# Patient Record
Sex: FEMALE | Race: WHITE | NOT HISPANIC OR LATINO
[De-identification: names, ages, dates, MRNs, and addresses within clinical notes are randomized per-mention and may not be internally consistent; named-entity substitution may affect disease eponyms.]

---

## 2017-01-03 ENCOUNTER — TRANSCRIPTION ENCOUNTER (OUTPATIENT)
Age: 52
End: 2017-01-03

## 2017-10-04 ENCOUNTER — APPOINTMENT (OUTPATIENT)
Dept: GASTROENTEROLOGY | Facility: CLINIC | Age: 52
End: 2017-10-04

## 2017-10-25 ENCOUNTER — TRANSCRIPTION ENCOUNTER (OUTPATIENT)
Age: 52
End: 2017-10-25

## 2017-12-02 ENCOUNTER — TRANSCRIPTION ENCOUNTER (OUTPATIENT)
Age: 52
End: 2017-12-02

## 2019-11-03 ENCOUNTER — TRANSCRIPTION ENCOUNTER (OUTPATIENT)
Age: 54
End: 2019-11-03

## 2020-02-23 ENCOUNTER — TRANSCRIPTION ENCOUNTER (OUTPATIENT)
Age: 55
End: 2020-02-23

## 2020-08-17 ENCOUNTER — TRANSCRIPTION ENCOUNTER (OUTPATIENT)
Age: 55
End: 2020-08-17

## 2020-10-31 ENCOUNTER — TRANSCRIPTION ENCOUNTER (OUTPATIENT)
Age: 55
End: 2020-10-31

## 2020-11-16 ENCOUNTER — TRANSCRIPTION ENCOUNTER (OUTPATIENT)
Age: 55
End: 2020-11-16

## 2020-12-19 ENCOUNTER — TRANSCRIPTION ENCOUNTER (OUTPATIENT)
Age: 55
End: 2020-12-19

## 2021-01-02 ENCOUNTER — TRANSCRIPTION ENCOUNTER (OUTPATIENT)
Age: 56
End: 2021-01-02

## 2021-06-28 ENCOUNTER — TRANSCRIPTION ENCOUNTER (OUTPATIENT)
Age: 56
End: 2021-06-28

## 2021-07-11 ENCOUNTER — TRANSCRIPTION ENCOUNTER (OUTPATIENT)
Age: 56
End: 2021-07-11

## 2021-11-08 ENCOUNTER — NON-APPOINTMENT (OUTPATIENT)
Age: 56
End: 2021-11-08

## 2021-11-11 ENCOUNTER — APPOINTMENT (OUTPATIENT)
Dept: INTERNAL MEDICINE | Facility: CLINIC | Age: 56
End: 2021-11-11

## 2022-01-17 ENCOUNTER — TRANSCRIPTION ENCOUNTER (OUTPATIENT)
Age: 57
End: 2022-01-17

## 2022-04-20 ENCOUNTER — TRANSCRIPTION ENCOUNTER (OUTPATIENT)
Age: 57
End: 2022-04-20

## 2022-04-20 ENCOUNTER — NON-APPOINTMENT (OUTPATIENT)
Age: 57
End: 2022-04-20

## 2022-05-13 ENCOUNTER — APPOINTMENT (OUTPATIENT)
Dept: INTERNAL MEDICINE | Facility: CLINIC | Age: 57
End: 2022-05-13

## 2022-05-25 ENCOUNTER — NON-APPOINTMENT (OUTPATIENT)
Age: 57
End: 2022-05-25

## 2022-11-16 ENCOUNTER — OFFICE (OUTPATIENT)
Dept: URBAN - METROPOLITAN AREA CLINIC 116 | Facility: CLINIC | Age: 57
Setting detail: OPHTHALMOLOGY
End: 2022-11-16
Payer: COMMERCIAL

## 2022-11-16 DIAGNOSIS — H25.13: ICD-10-CM

## 2022-11-16 DIAGNOSIS — H52.4: ICD-10-CM

## 2022-11-16 PROCEDURE — CLNEW CL LENS FIT FIRST TIME WEARER FITTING FEE ONLY: Performed by: OPTOMETRIST

## 2022-11-16 PROCEDURE — 92014 COMPRE OPH EXAM EST PT 1/>: CPT | Performed by: OPTOMETRIST

## 2022-11-16 ASSESSMENT — TONOMETRY
OS_IOP_MMHG: 12
OD_IOP_MMHG: 12

## 2022-11-16 ASSESSMENT — CONFRONTATIONAL VISUAL FIELD TEST (CVF)
OD_FINDINGS: FULL
OS_FINDINGS: FULL

## 2022-12-09 ENCOUNTER — OFFICE (OUTPATIENT)
Dept: URBAN - METROPOLITAN AREA CLINIC 116 | Facility: CLINIC | Age: 57
Setting detail: OPHTHALMOLOGY
End: 2022-12-09
Payer: COMMERCIAL

## 2022-12-09 DIAGNOSIS — H52.4: ICD-10-CM

## 2022-12-09 PROCEDURE — N/C NO CHARGE: Performed by: OPTOMETRIST

## 2022-12-09 ASSESSMENT — REFRACTION_CURRENTRX
OS_VPRISM_DIRECTION: PROGS
OD_SPHERE: +0.75
OD_AXIS: 175
OS_SPHERE: +0.50
OD_ADD: +1.75
OD_OVR_VA: 20/
OS_OVR_VA: 20/
OD_CYLINDER: SPH
OS_CYLINDER: SPH
OS_CYLINDER: SPH
OD_ADD: +1.75
OD_VPRISM_DIRECTION: PROGS
OD_SPHERE: +3.00
OS_ADD: +1.75
OD_VPRISM_DIRECTION: SV
OS_OVR_VA: 20/
OD_SPHERE: +0.50
OS_OVR_VA: 20/
OS_VPRISM_DIRECTION: SV
OS_SPHERE: +3.00
OD_CYLINDER: -0.25
OD_CYLINDER: SPH
OS_ADD: +2.00
OS_CYLINDER: SPH
OD_OVR_VA: 20/
OD_VPRISM_DIRECTION: PROGS
OD_OVR_VA: 20/
OS_VPRISM_DIRECTION: PROGS
OS_SPHERE: +0.50

## 2022-12-09 ASSESSMENT — SPHEQUIV_DERIVED
OS_SPHEQUIV: 0.875
OD_SPHEQUIV: 0.875

## 2022-12-09 ASSESSMENT — AXIALLENGTH_DERIVED
OD_AL: 23.2559
OS_AL: 23.2559

## 2022-12-09 ASSESSMENT — REFRACTION_MANIFEST
OD_CYLINDER: SPH
OD_VA2: 20/20
OS_CYLINDER: SPH
OD_ADD: +1.75
OD_SPHERE: +0.75
OS_VA2: 20/20
OD_ADD: +2.50
OS_ADD: +1.75
OS_SPHERE: +0.75
OS_SPHERE: +0.50
OD_CYLINDER: SPH
OS_VA1: 20/20
OS_ADD: +2.50
OS_VA1: 20/20
OD_SPHERE: +0.50
OD_VA1: 20/20
OD_VA1: 20/20
OS_CYLINDER: SPH

## 2022-12-09 ASSESSMENT — REFRACTION_AUTOREFRACTION
OS_SPHERE: +1.00
OD_SPHERE: +1.00
OS_CYLINDER: -0.25
OS_AXIS: 140
OD_CYLINDER: -0.25
OD_AXIS: 070

## 2022-12-09 ASSESSMENT — KERATOMETRY
OD_K2POWER_DIOPTERS: 43.50
OD_K1POWER_DIOPTERS: 43.50
OD_AXISANGLE_DEGREES: 090
OS_AXISANGLE_DEGREES: 050
OS_K1POWER_DIOPTERS: 43.25
OS_K2POWER_DIOPTERS: 43.75

## 2022-12-09 ASSESSMENT — VISUAL ACUITY
OD_BCVA: 20/30
OS_BCVA: 20/30

## 2022-12-12 ASSESSMENT — REFRACTION_CURRENTRX
OS_OVR_VA: 20/
OS_ADD: +1.75
OD_OVR_VA: 20/
OS_OVR_VA: 20/
OD_SPHERE: +0.50
OD_VPRISM_DIRECTION: PROGS
OD_ADD: +1.75
OS_SPHERE: +0.50
OD_SPHERE: +3.00
OD_VPRISM_DIRECTION: SV
OS_OVR_VA: 20/
OS_VPRISM_DIRECTION: PROGS
OD_SPHERE: +0.75
OD_CYLINDER: -0.25
OD_AXIS: 175
OS_SPHERE: +3.00
OD_OVR_VA: 20/
OS_CYLINDER: SPH
OD_VPRISM_DIRECTION: PROGS
OS_CYLINDER: SPH
OD_OVR_VA: 20/
OS_SPHERE: +0.50
OS_CYLINDER: SPH
OD_CYLINDER: SPH
OS_VPRISM_DIRECTION: PROGS
OD_CYLINDER: SPH
OS_ADD: +2.00
OS_VPRISM_DIRECTION: SV
OD_ADD: +1.75

## 2022-12-12 ASSESSMENT — REFRACTION_MANIFEST
OD_ADD: +2.50
OD_ADD: +1.75
OD_VA1: 20/20
OS_VA1: 20/20
OS_VA1: 20/20
OS_SPHERE: +0.75
OD_VA2: 20/20
OS_CYLINDER: SPH
OD_SPHERE: +0.50
OS_ADD: +1.75
OS_VA2: 20/20
OS_CYLINDER: SPH
OS_SPHERE: +0.50
OD_CYLINDER: SPH
OD_VA1: 20/20
OS_ADD: +2.50
OD_SPHERE: +0.75
OD_CYLINDER: SPH

## 2022-12-12 ASSESSMENT — KERATOMETRY
OS_K1POWER_DIOPTERS: 43.25
OD_K2POWER_DIOPTERS: 43.50
OS_AXISANGLE_DEGREES: 050
OD_AXISANGLE_DEGREES: 090
OD_K1POWER_DIOPTERS: 43.50
OS_K2POWER_DIOPTERS: 43.75

## 2022-12-12 ASSESSMENT — AXIALLENGTH_DERIVED
OS_AL: 23.2559
OD_AL: 23.2559

## 2022-12-12 ASSESSMENT — SPHEQUIV_DERIVED
OD_SPHEQUIV: 0.875
OS_SPHEQUIV: 0.875

## 2022-12-12 ASSESSMENT — REFRACTION_AUTOREFRACTION
OD_SPHERE: +1.00
OD_CYLINDER: -0.25
OS_AXIS: 140
OS_SPHERE: +1.00
OS_CYLINDER: -0.25
OD_AXIS: 070

## 2022-12-12 ASSESSMENT — VISUAL ACUITY
OD_BCVA: 20/30
OS_BCVA: 20/30

## 2022-12-19 ENCOUNTER — OFFICE (OUTPATIENT)
Dept: URBAN - METROPOLITAN AREA CLINIC 116 | Facility: CLINIC | Age: 57
Setting detail: OPHTHALMOLOGY
End: 2022-12-19
Payer: COMMERCIAL

## 2022-12-19 DIAGNOSIS — H52.4: ICD-10-CM

## 2022-12-19 PROCEDURE — N/C NO CHARGE: Performed by: OPTOMETRIST

## 2022-12-19 ASSESSMENT — KERATOMETRY
OD_AXISANGLE_DEGREES: 090
OS_AXISANGLE_DEGREES: 050
OS_K1POWER_DIOPTERS: 43.25
OD_K1POWER_DIOPTERS: 43.50
OD_K2POWER_DIOPTERS: 43.50
OS_K2POWER_DIOPTERS: 43.75

## 2022-12-19 ASSESSMENT — VISUAL ACUITY
OS_BCVA: 20/30
OD_BCVA: 20/30

## 2022-12-19 ASSESSMENT — CONFRONTATIONAL VISUAL FIELD TEST (CVF)
OS_FINDINGS: FULL
OD_FINDINGS: FULL

## 2023-05-10 ASSESSMENT — REFRACTION_MANIFEST
OD_VA1: 20/20
OD_VA1: 20/20
OS_ADD: +2.50
OD_VA2: 20/20
OS_SPHERE: +0.75
OD_ADD: +1.75
OD_SPHERE: +0.75
OS_SPHERE: -1.50
OS_CYLINDER: SPH
OS_SPHERE: +0.50
OS_SPHERE: +0.75
OS_CYLINDER: SPH
OS_VA1: 20/20
OS_CYLINDER: SPH
OD_CYLINDER: SPH
OS_ADD: +1.75
OS_ADD: +1.75
OD_CYLINDER: SPH
OS_VA1: 20/20
OD_SPHERE: +0.75
OD_SPHERE: PLANO
OD_CYLINDER: SPH
OS_CYLINDER: SPH
OS_VA2: 20/20
OD_VA1: 20/20
OD_ADD: +2.50
OD_SPHERE: +0.50
OS_VA1: 20/20
OD_ADD: +1.75

## 2023-05-10 ASSESSMENT — REFRACTION_CURRENTRX
OD_OVR_VA: 20/
OS_SPHERE: +0.50
OS_OVR_VA: 20/
OS_OVR_VA: 20/
OD_SPHERE: +3.00
OD_ADD: +1.75
OD_OVR_VA: 20/
OS_VPRISM_DIRECTION: SV
OD_CYLINDER: SPH
OD_CYLINDER: SPH
OS_CYLINDER: SPH
OD_ADD: +1.75
OS_CYLINDER: SPH
OS_CYLINDER: SPH
OD_SPHERE: +0.50
OS_SPHERE: +0.50
OS_VPRISM_DIRECTION: PROGS
OS_ADD: +2.00
OS_VPRISM_DIRECTION: PROGS
OS_ADD: +1.75
OD_CYLINDER: -0.25
OS_SPHERE: +3.00
OD_VPRISM_DIRECTION: PROGS
OD_AXIS: 175
OD_VPRISM_DIRECTION: PROGS
OS_OVR_VA: 20/
OD_OVR_VA: 20/
OD_SPHERE: +0.75
OD_VPRISM_DIRECTION: SV

## 2023-05-10 ASSESSMENT — KERATOMETRY
OD_K1POWER_DIOPTERS: 43.50
OD_AXISANGLE_DEGREES: 090
OS_K2POWER_DIOPTERS: 43.75
OS_AXISANGLE_DEGREES: 050
OD_K2POWER_DIOPTERS: 43.50
OS_K1POWER_DIOPTERS: 43.25

## 2023-05-10 ASSESSMENT — REFRACTION_AUTOREFRACTION
OD_CYLINDER: -0.25
OS_SPHERE: +1.00
OD_AXIS: 070
OS_CYLINDER: -0.25
OS_AXIS: 140
OD_SPHERE: +1.00

## 2023-05-10 ASSESSMENT — AXIALLENGTH_DERIVED
OS_AL: 23.2559
OD_AL: 23.2559

## 2023-05-10 ASSESSMENT — SPHEQUIV_DERIVED
OS_SPHEQUIV: 0.875
OD_SPHEQUIV: 0.875

## 2023-08-03 ENCOUNTER — NON-APPOINTMENT (OUTPATIENT)
Age: 58
End: 2023-08-03

## 2023-08-04 DIAGNOSIS — Z92.89 PERSONAL HISTORY OF OTHER MEDICAL TREATMENT: ICD-10-CM

## 2023-08-04 DIAGNOSIS — Z82.79 FAMILY HISTORY OF OTHER CONGENITAL MALFORMATIONS, DEFORMATIONS AND CHROMOSOMAL ABNORMALITIES: ICD-10-CM

## 2023-08-04 DIAGNOSIS — Z82.49 FAMILY HISTORY OF ISCHEMIC HEART DISEASE AND OTHER DISEASES OF THE CIRCULATORY SYSTEM: ICD-10-CM

## 2023-08-04 DIAGNOSIS — Z78.9 OTHER SPECIFIED HEALTH STATUS: ICD-10-CM

## 2023-08-04 DIAGNOSIS — R00.1 BRADYCARDIA, UNSPECIFIED: ICD-10-CM

## 2023-10-06 ENCOUNTER — NON-APPOINTMENT (OUTPATIENT)
Age: 58
End: 2023-10-06

## 2023-10-06 ENCOUNTER — APPOINTMENT (OUTPATIENT)
Dept: FAMILY MEDICINE | Facility: CLINIC | Age: 58
End: 2023-10-06
Payer: COMMERCIAL

## 2023-10-06 VITALS
OXYGEN SATURATION: 98 % | HEIGHT: 68 IN | SYSTOLIC BLOOD PRESSURE: 108 MMHG | DIASTOLIC BLOOD PRESSURE: 70 MMHG | BODY MASS INDEX: 21.82 KG/M2 | WEIGHT: 144 LBS

## 2023-10-06 DIAGNOSIS — Z00.00 ENCOUNTER FOR GENERAL ADULT MEDICAL EXAMINATION W/OUT ABNORMAL FINDINGS: ICD-10-CM

## 2023-10-06 DIAGNOSIS — M85.80 OTHER SPECIFIED DISORDERS OF BONE DENSITY AND STRUCTURE, UNSPECIFIED SITE: ICD-10-CM

## 2023-10-06 DIAGNOSIS — Z23 ENCOUNTER FOR IMMUNIZATION: ICD-10-CM

## 2023-10-06 DIAGNOSIS — Z83.79 FAMILY HISTORY OF OTHER DISEASES OF THE DIGESTIVE SYSTEM: ICD-10-CM

## 2023-10-06 PROCEDURE — 90686 IIV4 VACC NO PRSV 0.5 ML IM: CPT

## 2023-10-06 PROCEDURE — 99386 PREV VISIT NEW AGE 40-64: CPT

## 2023-10-06 PROCEDURE — G0444 DEPRESSION SCREEN ANNUAL: CPT | Mod: 59

## 2023-10-06 PROCEDURE — G0008: CPT

## 2023-10-07 ENCOUNTER — TRANSCRIPTION ENCOUNTER (OUTPATIENT)
Age: 58
End: 2023-10-07

## 2023-11-08 ENCOUNTER — LABORATORY RESULT (OUTPATIENT)
Age: 58
End: 2023-11-08

## 2023-11-08 ENCOUNTER — APPOINTMENT (OUTPATIENT)
Dept: CARDIOLOGY | Facility: CLINIC | Age: 58
End: 2023-11-08
Payer: COMMERCIAL

## 2023-11-08 VITALS
HEIGHT: 68 IN | DIASTOLIC BLOOD PRESSURE: 72 MMHG | OXYGEN SATURATION: 99 % | SYSTOLIC BLOOD PRESSURE: 104 MMHG | HEART RATE: 69 BPM | WEIGHT: 137 LBS | BODY MASS INDEX: 20.76 KG/M2

## 2023-11-08 DIAGNOSIS — Z82.79 FAMILY HISTORY OF OTHER CONGENITAL MALFORMATIONS, DEFORMATIONS AND CHROMOSOMAL ABNORMALITIES: ICD-10-CM

## 2023-11-08 DIAGNOSIS — I34.1 NONRHEUMATIC MITRAL (VALVE) PROLAPSE: ICD-10-CM

## 2023-11-08 PROCEDURE — 93000 ELECTROCARDIOGRAM COMPLETE: CPT

## 2023-11-08 PROCEDURE — 99213 OFFICE O/P EST LOW 20 MIN: CPT | Mod: 25

## 2023-11-09 LAB
25(OH)D3 SERPL-MCNC: 45.7 NG/ML
ALBUMIN SERPL ELPH-MCNC: 4.5 G/DL
ALP BLD-CCNC: 69 U/L
ALT SERPL-CCNC: 12 U/L
ANION GAP SERPL CALC-SCNC: 9 MMOL/L
AST SERPL-CCNC: 22 U/L
BILIRUB SERPL-MCNC: 0.4 MG/DL
BUN SERPL-MCNC: 15 MG/DL
CALCIUM SERPL-MCNC: 9.3 MG/DL
CHLORIDE SERPL-SCNC: 104 MMOL/L
CHOLEST SERPL-MCNC: 168 MG/DL
CO2 SERPL-SCNC: 27 MMOL/L
CREAT SERPL-MCNC: 0.91 MG/DL
EGFR: 74 ML/MIN/1.73M2
ESTIMATED AVERAGE GLUCOSE: 111 MG/DL
FERRITIN SERPL-MCNC: 6 NG/ML
GLUCOSE SERPL-MCNC: 91 MG/DL
HBA1C MFR BLD HPLC: 5.5 %
HCT VFR BLD CALC: 39.6 %
HCV AB SER QL: NONREACTIVE
HCV S/CO RATIO: 0.12 S/CO
HDLC SERPL-MCNC: 106 MG/DL
HGB BLD-MCNC: 12.1 G/DL
HIV1+2 AB SPEC QL IA.RAPID: NONREACTIVE
IRON SATN MFR SERPL: 10 %
IRON SERPL-MCNC: 39 UG/DL
LDLC SERPL CALC-MCNC: 52 MG/DL
MCHC RBC-ENTMCNC: 26.5 PG
MCHC RBC-ENTMCNC: 30.6 GM/DL
MCV RBC AUTO: 86.8 FL
NONHDLC SERPL-MCNC: 62 MG/DL
PLATELET # BLD AUTO: 167 K/UL
POTASSIUM SERPL-SCNC: 4.3 MMOL/L
PROT SERPL-MCNC: 6.4 G/DL
RBC # BLD: 4.56 M/UL
RBC # FLD: 17.4 %
SODIUM SERPL-SCNC: 139 MMOL/L
TIBC SERPL-MCNC: 398 UG/DL
TRIGL SERPL-MCNC: 43 MG/DL
TSH SERPL-ACNC: 3.23 UIU/ML
UIBC SERPL-MCNC: 359 UG/DL
WBC # FLD AUTO: 3.29 K/UL

## 2023-11-10 LAB — CELIACPAN: NORMAL

## 2023-12-05 ENCOUNTER — APPOINTMENT (OUTPATIENT)
Dept: FAMILY MEDICINE | Facility: CLINIC | Age: 58
End: 2023-12-05
Payer: COMMERCIAL

## 2023-12-05 VITALS
TEMPERATURE: 97.5 F | WEIGHT: 139 LBS | HEIGHT: 68 IN | OXYGEN SATURATION: 98 % | BODY MASS INDEX: 21.07 KG/M2 | HEART RATE: 76 BPM | SYSTOLIC BLOOD PRESSURE: 104 MMHG | DIASTOLIC BLOOD PRESSURE: 66 MMHG

## 2023-12-05 DIAGNOSIS — D64.9 ANEMIA, UNSPECIFIED: ICD-10-CM

## 2023-12-05 DIAGNOSIS — H93.12 TINNITUS, LEFT EAR: ICD-10-CM

## 2023-12-05 PROCEDURE — 99214 OFFICE O/P EST MOD 30 MIN: CPT | Mod: 25

## 2023-12-05 PROCEDURE — 36415 COLL VENOUS BLD VENIPUNCTURE: CPT

## 2023-12-11 ENCOUNTER — APPOINTMENT (OUTPATIENT)
Dept: CARDIOLOGY | Facility: CLINIC | Age: 58
End: 2023-12-11
Payer: COMMERCIAL

## 2023-12-11 PROCEDURE — 93306 TTE W/DOPPLER COMPLETE: CPT

## 2023-12-18 ENCOUNTER — APPOINTMENT (OUTPATIENT)
Dept: CARDIOLOGY | Facility: CLINIC | Age: 58
End: 2023-12-18
Payer: COMMERCIAL

## 2023-12-18 DIAGNOSIS — R00.2 PALPITATIONS: ICD-10-CM

## 2023-12-18 PROCEDURE — 99441: CPT

## 2023-12-18 NOTE — DISCUSSION/SUMMARY
[FreeTextEntry1] : Family history of congenital heart disease and a family history of cardiomyopathy Status post normal echocardiogram 12/11/2023  Palpitations Rare, asymptomatic.  If they were to become more frequent we would then consider further evaluation by outpatient telemetry    RTO 2 years

## 2023-12-18 NOTE — CARDIOLOGY SUMMARY
[de-identified] :  - Family history of congenital heart disease: sister  at 1-month-old - Family history of a cardiomyopathy: Brother  18 years old from a reported cardiomyopathy - ECHOCARDIOGRAM: 2023, EF 55 to 60%, trace aortic insufficiency, trace mitral regurgitation and tricuspid regurgitation PA systolic 13

## 2023-12-18 NOTE — HISTORY OF PRESENT ILLNESS
[FreeTextEntry1] : The patient is a 58-year-old white female with a family history of a cardiac congenital anomaly and a family history of a cardiomyopathy. The patient performs yoga, bikes, and walks without chest pain or dyspnea.  She reports rare palpitations that are unassociated with dizziness or loss of consciousness.  The palpitations are infrequent. An echocardiogram on December 11 demonstrated a normal ejection fraction and the absence of valve disease. Her cholesterol was reviewed and is acceptable. I reviewed the results with the patient.  A plan was developed.  Her questions were answered.  We spoke on the phone for 5 minutes.  She consented to a telephone telemedicine visit

## 2023-12-18 NOTE — REASON FOR VISIT
[FreeTextEntry1] : Telephone telemedicine visit   chief complaint: Family history of congenital heart disease/cardiomyopathy

## 2024-03-05 ENCOUNTER — APPOINTMENT (OUTPATIENT)
Dept: OTOLARYNGOLOGY | Facility: CLINIC | Age: 59
End: 2024-03-05
Payer: COMMERCIAL

## 2024-03-05 VITALS
BODY MASS INDEX: 21.22 KG/M2 | SYSTOLIC BLOOD PRESSURE: 114 MMHG | HEART RATE: 76 BPM | DIASTOLIC BLOOD PRESSURE: 76 MMHG | WEIGHT: 140 LBS | HEIGHT: 68 IN

## 2024-03-05 DIAGNOSIS — H92.09 OTALGIA, UNSPECIFIED EAR: ICD-10-CM

## 2024-03-05 DIAGNOSIS — M26.609 UNSPECIFIED TEMPOROMANDIBULAR JOINT DISORDER: ICD-10-CM

## 2024-03-05 PROCEDURE — 31231 NASAL ENDOSCOPY DX: CPT

## 2024-03-05 PROCEDURE — 99203 OFFICE O/P NEW LOW 30 MIN: CPT | Mod: 25

## 2024-03-05 RX ORDER — METHYLPREDNISOLONE 4 MG/1
4 TABLET ORAL
Qty: 1 | Refills: 0 | Status: COMPLETED | COMMUNITY
Start: 2023-12-05 | End: 2024-03-05

## 2024-03-06 PROBLEM — H92.09 OTALGIA: Status: ACTIVE | Noted: 2024-03-06

## 2024-03-06 NOTE — PROCEDURE
[FreeTextEntry6] : Procedure: Bilateral nasal endoscopy (CPT 64595)   Indication: Anterior rhinoscopy was inadequate to evaluate pathology.  Left: Septum midline Moderate inferior turbinate hypertrophy  Middle meatus clear, without masses, polyps, or pus Sphenoethmoidal recess clear, without masses, polyps, or pus  Right:  Septum midline Moderate inferior turbinate hypertrophy Middle meatus clear, without masses, polyps, or pus  Sphenoethmoidal recess clear, without masses, polyps, or pus  ET orifice clear, fossa of rosenmuller clear [de-identified] : Laryngoscopy: NPL: Nasopharynx clear without masses Base of tongue without masses or lesions Vallecula clear Pyriforms clear Epiglottis crisp TVFs mobile bilaterally  Arytenoids normal Glottic airway patent Bilateral ET orifice patent

## 2024-03-06 NOTE — HISTORY OF PRESENT ILLNESS
[de-identified] : 59F presenting with L ear pressure/hearing loss and facial pain. She received steroids from her PCP which seemed to have resolve it but pressure/facial pain persists. She wears a mouth guard and is aware of her grinding her teeth at night. No recent hearing loss. Symptoms worse in AM.

## 2024-03-06 NOTE — PLAN
[TextEntry] : I believe her symptoms are most likely consistent with TMJ. However, we will obtain an audiogram. If the pain persists, we can consider CT neck although her exam is benign.   I will call with audio results.

## 2024-03-06 NOTE — PHYSICAL EXAM
[Nasal Endoscopy Performed] : nasal endoscopy was performed, see procedure section for findings [Normal] : mucosa is normal [Midline] : trachea located in midline position [de-identified] : 1+ bilaterally [FreeTextEntry2] : L TMJ crackling, R TMJ popping

## 2024-03-06 NOTE — CONSULT LETTER
[Dear  ___] : Dear  [unfilled], [Please see my note below.] : Please see my note below. [Consult Letter:] : I had the pleasure of evaluating your patient, [unfilled]. [Sincerely,] : Sincerely, [Consult Closing:] : Thank you very much for allowing me to participate in the care of this patient.  If you have any questions, please do not hesitate to contact me. [FreeTextEntry2] : Cary Cordero [FreeTextEntry3] : Edgar Odonnell MD, VARUN  Otolaryngology  Sinus and Endoscopic Skull Base Surgery  Head and Neck Surgery   [address], [address]  [place] [address]  Tel: [phone]  Fax:[phone]  MARCIAL Michaels, PA-C Department of Otolaryngology [facility] [place] [facility] Otol[facility]ology at [place]

## 2024-03-31 LAB
FERRITIN SERPL-MCNC: 9 NG/ML
HCT VFR BLD CALC: 41.9 %
HGB BLD-MCNC: 12.8 G/DL
IRON SATN MFR SERPL: 15 %
IRON SERPL-MCNC: 56 UG/DL
MCHC RBC-ENTMCNC: 28.5 PG
MCHC RBC-ENTMCNC: 30.5 GM/DL
MCV RBC AUTO: 93.3 FL
PLATELET # BLD AUTO: 145 K/UL
RBC # BLD: 4.49 M/UL
RBC # FLD: 15.5 %
TIBC SERPL-MCNC: 365 UG/DL
UIBC SERPL-MCNC: 310 UG/DL
WBC # FLD AUTO: 3.34 K/UL

## 2024-04-23 ENCOUNTER — OFFICE (OUTPATIENT)
Dept: URBAN - METROPOLITAN AREA CLINIC 114 | Facility: CLINIC | Age: 59
Setting detail: OPHTHALMOLOGY
End: 2024-04-23
Payer: COMMERCIAL

## 2024-04-23 DIAGNOSIS — H52.4: ICD-10-CM

## 2024-04-23 DIAGNOSIS — H16.221: ICD-10-CM

## 2024-04-23 DIAGNOSIS — H16.223: ICD-10-CM

## 2024-04-23 DIAGNOSIS — H16.222: ICD-10-CM

## 2024-04-23 DIAGNOSIS — H25.13: ICD-10-CM

## 2024-04-23 PROCEDURE — 92014 COMPRE OPH EXAM EST PT 1/>: CPT | Performed by: OPHTHALMOLOGY

## 2024-04-23 PROCEDURE — 83861 MICROFLUID ANALY TEARS: CPT | Mod: QW | Performed by: OPHTHALMOLOGY

## 2024-04-23 PROCEDURE — 92015 DETERMINE REFRACTIVE STATE: CPT | Performed by: OPHTHALMOLOGY

## 2024-07-15 ENCOUNTER — NON-APPOINTMENT (OUTPATIENT)
Age: 59
End: 2024-07-15

## 2024-07-15 ENCOUNTER — APPOINTMENT (OUTPATIENT)
Dept: OBGYN | Facility: CLINIC | Age: 59
End: 2024-07-15
Payer: COMMERCIAL

## 2024-07-15 VITALS
HEIGHT: 68.5 IN | DIASTOLIC BLOOD PRESSURE: 84 MMHG | WEIGHT: 137 LBS | SYSTOLIC BLOOD PRESSURE: 145 MMHG | BODY MASS INDEX: 20.52 KG/M2

## 2024-07-15 DIAGNOSIS — Z92.89 PERSONAL HISTORY OF OTHER MEDICAL TREATMENT: ICD-10-CM

## 2024-07-15 DIAGNOSIS — Z80.0 FAMILY HISTORY OF MALIGNANT NEOPLASM OF DIGESTIVE ORGANS: ICD-10-CM

## 2024-07-15 DIAGNOSIS — Z00.00 ENCOUNTER FOR GENERAL ADULT MEDICAL EXAMINATION W/OUT ABNORMAL FINDINGS: ICD-10-CM

## 2024-07-15 DIAGNOSIS — Z86.19 PERSONAL HISTORY OF OTHER INFECTIOUS AND PARASITIC DISEASES: ICD-10-CM

## 2024-07-15 DIAGNOSIS — Z86.79 PERSONAL HISTORY OF OTHER DISEASES OF THE CIRCULATORY SYSTEM: ICD-10-CM

## 2024-07-15 DIAGNOSIS — Z83.3 FAMILY HISTORY OF DIABETES MELLITUS: ICD-10-CM

## 2024-07-15 DIAGNOSIS — Z80.8 FAMILY HISTORY OF MALIGNANT NEOPLASM OF OTHER ORGANS OR SYSTEMS: ICD-10-CM

## 2024-07-15 DIAGNOSIS — Z82.62 FAMILY HISTORY OF OSTEOPOROSIS: ICD-10-CM

## 2024-07-15 DIAGNOSIS — Z98.890 OTHER SPECIFIED POSTPROCEDURAL STATES: ICD-10-CM

## 2024-07-15 DIAGNOSIS — Z82.49 FAMILY HISTORY OF ISCHEMIC HEART DISEASE AND OTHER DISEASES OF THE CIRCULATORY SYSTEM: ICD-10-CM

## 2024-07-15 PROCEDURE — 99386 PREV VISIT NEW AGE 40-64: CPT

## 2024-07-16 LAB — HPV HIGH+LOW RISK DNA PNL CVX: NOT DETECTED

## 2024-07-18 LAB — CYTOLOGY CVX/VAG DOC THIN PREP: ABNORMAL

## 2025-02-01 ENCOUNTER — NON-APPOINTMENT (OUTPATIENT)
Age: 60
End: 2025-02-01

## 2025-03-11 ENCOUNTER — OFFICE (OUTPATIENT)
Dept: URBAN - METROPOLITAN AREA CLINIC 114 | Facility: CLINIC | Age: 60
Setting detail: OPHTHALMOLOGY
End: 2025-03-11
Payer: COMMERCIAL

## 2025-03-11 DIAGNOSIS — H00.11: ICD-10-CM

## 2025-03-11 PROCEDURE — 92012 INTRM OPH EXAM EST PATIENT: CPT | Performed by: OPHTHALMOLOGY

## 2025-03-11 ASSESSMENT — REFRACTION_CURRENTRX
OD_AXIS: 175
OD_SPHERE: +0.75
OS_AXIS: 128
OS_ADD: +2.00
OS_SPHERE: +1.25
OD_ADD: +1.50
OD_SPHERE: +0.50
OD_ADD: +1.75
OD_CYLINDER: -0.25
OD_CYLINDER: 0.00
OS_VPRISM_DIRECTION: PROGS
OS_OVR_VA: 20/
OD_CYLINDER: SPH
OS_VPRISM_DIRECTION: PROGS
OS_CYLINDER: SPH
OD_AXIS: 000
OD_CYLINDER: -0.25
OD_ADD: +1.75
OD_VPRISM_DIRECTION: PROGS
OS_SPHERE: +0.50
OD_VPRISM_DIRECTION: PROGS
OS_ADD: +1.75
OS_OVR_VA: 20/
OS_AXIS: 005
OS_CYLINDER: -0.25
OD_OVR_VA: 20/
OD_VPRISM_DIRECTION: PROGS
OS_VPRISM_DIRECTION: PROGS
OS_VPRISM_DIRECTION: PROGS
OD_ADD: +1.75
OD_AXIS: 107
OS_ADD: +1.75
OS_OVR_VA: 20/
OS_SPHERE: +0.50
OD_VPRISM_DIRECTION: PROGS
OS_ADD: +1.50
OS_SPHERE: +1.00
OD_OVR_VA: 20/
OS_CYLINDER: SPH
OD_SPHERE: +0.75
OD_SPHERE: +1.00
OS_CYLINDER: -0.25
OD_OVR_VA: 20/

## 2025-03-11 ASSESSMENT — VISUAL ACUITY
OD_BCVA: 20/25+1
OS_BCVA: 20/25-1

## 2025-03-11 ASSESSMENT — KERATOMETRY
OD_K1POWER_DIOPTERS: 42.75
OD_AXISANGLE_DEGREES: 011
OS_K1POWER_DIOPTERS: 43.50
OS_K2POWER_DIOPTERS: 43.50
OD_K2POWER_DIOPTERS: 43.75
OS_AXISANGLE_DEGREES: 090

## 2025-03-11 ASSESSMENT — REFRACTION_MANIFEST
OS_ADD: +1.75
OS_VA1: 20/20
OS_SPHERE: -1.50
OS_CYLINDER: -0.25
OS_VA1: 20/20
OD_SPHERE: +0.75
OD_VA2: 20/20
OS_ADD: +1.75
OD_VA1: 20/20
OD_VA1: 20/20
OD_ADD: +1.75
OD_ADD: +1.75
OS_VA1: 20/20-1
OS_CYLINDER: SPH
OS_CYLINDER: SPH
OD_ADD: +2.00
OD_SPHERE: PLANO
OS_CYLINDER: SPH
OS_SPHERE: +0.75
OS_SPHERE: +0.75
OD_CYLINDER: SPH
OD_SPHERE: +1.00
OD_SPHERE: +0.75
OS_SPHERE: +1.00
OU_VA: 20/20
OD_VA1: 20/25-1
OS_VA2: 20/20
OD_CYLINDER: SPH
OS_ADD: +2.00
OD_AXIS: 112
OS_AXIS: 123
OD_CYLINDER: -0.25

## 2025-03-11 ASSESSMENT — CONFRONTATIONAL VISUAL FIELD TEST (CVF)
OS_FINDINGS: FULL
OD_FINDINGS: FULL

## 2025-03-11 ASSESSMENT — REFRACTION_AUTOREFRACTION
OS_CYLINDER: -0.25
OD_CYLINDER: -1.50
OD_AXIS: 094
OD_SPHERE: +2.00
OS_AXIS: 108
OS_SPHERE: +1.50

## 2025-03-11 ASSESSMENT — SUPERFICIAL PUNCTATE KERATITIS (SPK)
OD_SPK: T
OS_SPK: T

## 2025-03-26 ENCOUNTER — OFFICE (OUTPATIENT)
Dept: URBAN - METROPOLITAN AREA CLINIC 104 | Facility: CLINIC | Age: 60
Setting detail: OPHTHALMOLOGY
End: 2025-03-26
Payer: COMMERCIAL

## 2025-03-26 DIAGNOSIS — H01.004: ICD-10-CM

## 2025-03-26 DIAGNOSIS — H01.001: ICD-10-CM

## 2025-03-26 DIAGNOSIS — H00.11: ICD-10-CM

## 2025-03-26 PROBLEM — B88.0 ACARIASIS, INFESTATION OF MITES AND OR TICS: Status: ACTIVE | Noted: 2025-03-26

## 2025-03-26 PROCEDURE — 99213 OFFICE O/P EST LOW 20 MIN: CPT | Performed by: OPHTHALMOLOGY

## 2025-03-26 PROCEDURE — 92285 EXTERNAL OCULAR PHOTOGRAPHY: CPT | Performed by: OPHTHALMOLOGY

## 2025-03-26 ASSESSMENT — LID EXAM ASSESSMENTS
OS_COMMENTS: +DEMODEX+COLLARETTES
OD_BLEPHARITIS: RUL 1+ 2+
OD_COMMENTS: +DEMODEX+COLLARETTES
OS_BLEPHARITIS: LUL 1+ 2+

## 2025-03-26 ASSESSMENT — SUPERFICIAL PUNCTATE KERATITIS (SPK)
OD_SPK: T
OS_SPK: T

## 2025-03-27 ASSESSMENT — KERATOMETRY
OS_K2POWER_DIOPTERS: 43.50
OD_AXISANGLE_DEGREES: 011
OD_K1POWER_DIOPTERS: 42.75
OS_K1POWER_DIOPTERS: 43.50
OD_K2POWER_DIOPTERS: 43.75
OS_AXISANGLE_DEGREES: 090

## 2025-03-27 ASSESSMENT — REFRACTION_AUTOREFRACTION
OD_AXIS: 094
OS_SPHERE: +1.50
OD_SPHERE: +2.00
OS_CYLINDER: -0.25
OD_CYLINDER: -1.50
OS_AXIS: 108

## 2025-03-27 ASSESSMENT — VISUAL ACUITY
OD_BCVA: 20/20
OS_BCVA: 20/20

## 2025-07-31 DIAGNOSIS — Z12.11 ENCOUNTER FOR SCREENING FOR MALIGNANT NEOPLASM OF COLON: ICD-10-CM

## 2025-07-31 DIAGNOSIS — Z12.39 ENCOUNTER FOR OTHER SCREENING FOR MALIGNANT NEOPLASM OF BREAST: ICD-10-CM

## 2025-08-04 ENCOUNTER — APPOINTMENT (OUTPATIENT)
Age: 60
End: 2025-08-04
Payer: COMMERCIAL

## 2025-08-04 VITALS
SYSTOLIC BLOOD PRESSURE: 106 MMHG | HEART RATE: 61 BPM | BODY MASS INDEX: 21.22 KG/M2 | DIASTOLIC BLOOD PRESSURE: 71 MMHG | WEIGHT: 140 LBS | HEIGHT: 68 IN

## 2025-08-04 DIAGNOSIS — R92.30 DENSE BREASTS, UNSPECIFIED: ICD-10-CM

## 2025-08-04 DIAGNOSIS — Z13.820 ENCOUNTER FOR SCREENING FOR OSTEOPOROSIS: ICD-10-CM

## 2025-08-04 DIAGNOSIS — Z01.411 ENCOUNTER FOR GYNECOLOGICAL EXAMINATION (GENERAL) (ROUTINE) WITH ABNORMAL FINDINGS: ICD-10-CM

## 2025-08-04 DIAGNOSIS — N81.10 CYSTOCELE, UNSPECIFIED: ICD-10-CM

## 2025-08-04 PROCEDURE — 99396 PREV VISIT EST AGE 40-64: CPT

## 2025-08-04 PROCEDURE — 99459 PELVIC EXAMINATION: CPT | Mod: NC

## 2025-08-06 LAB — HPV HIGH+LOW RISK DNA PNL CVX: NOT DETECTED

## 2025-08-07 LAB — CYTOLOGY CVX/VAG DOC THIN PREP: ABNORMAL
